# Patient Record
Sex: FEMALE | Race: BLACK OR AFRICAN AMERICAN | NOT HISPANIC OR LATINO | Employment: OTHER | URBAN - METROPOLITAN AREA
[De-identification: names, ages, dates, MRNs, and addresses within clinical notes are randomized per-mention and may not be internally consistent; named-entity substitution may affect disease eponyms.]

---

## 2024-02-28 ENCOUNTER — HOSPITAL ENCOUNTER (EMERGENCY)
Facility: CLINIC | Age: 69
Discharge: HOME | End: 2024-02-28
Attending: STUDENT IN AN ORGANIZED HEALTH CARE EDUCATION/TRAINING PROGRAM | Admitting: STUDENT IN AN ORGANIZED HEALTH CARE EDUCATION/TRAINING PROGRAM

## 2024-02-28 VITALS
TEMPERATURE: 97.8 F | DIASTOLIC BLOOD PRESSURE: 82 MMHG | WEIGHT: 105.6 LBS | OXYGEN SATURATION: 100 % | HEART RATE: 58 BPM | SYSTOLIC BLOOD PRESSURE: 159 MMHG | RESPIRATION RATE: 18 BRPM

## 2024-02-28 DIAGNOSIS — S03.00XA JAW DISLOCATION, INITIAL ENCOUNTER: ICD-10-CM

## 2024-02-28 PROCEDURE — 99285 EMERGENCY DEPT VISIT HI MDM: CPT | Mod: 25

## 2024-02-28 PROCEDURE — 21480 CLTX TMPRMAND DISLC 1ST/SBSQ: CPT | Mod: LT

## 2024-02-28 ASSESSMENT — ACTIVITIES OF DAILY LIVING (ADL): ADLS_ACUITY_SCORE: 33

## 2024-02-28 ASSESSMENT — COLUMBIA-SUICIDE SEVERITY RATING SCALE - C-SSRS
1. IN THE PAST MONTH, HAVE YOU WISHED YOU WERE DEAD OR WISHED YOU COULD GO TO SLEEP AND NOT WAKE UP?: NO
6. HAVE YOU EVER DONE ANYTHING, STARTED TO DO ANYTHING, OR PREPARED TO DO ANYTHING TO END YOUR LIFE?: NO
2. HAVE YOU ACTUALLY HAD ANY THOUGHTS OF KILLING YOURSELF IN THE PAST MONTH?: NO

## 2024-02-29 NOTE — ED NOTES
Pt comes in with reports of yawning and heard pop and felt pain to bilateral jaw sides that went up her face. Pt reports now pain is mainly to left of jaw. Pt mouth deviated toward right side.  Pt able to maintain airway on own and secretions. Pt speaking in full sentences.

## 2024-02-29 NOTE — ED TRIAGE NOTES
Daughter states that the patient was outside today for a while. Then yawned and now feels like something isn't right with her jaw. States that her tongue feels funny, and has a bad headache on the right side. ABCs intact. VSS.      Triage Assessment (Adult)       Row Name 02/28/24 2056          Triage Assessment    Airway WDL WDL        Respiratory WDL    Respiratory WDL WDL        Skin Circulation/Temperature WDL    Skin Circulation/Temperature WDL WDL        Cardiac WDL    Cardiac WDL WDL        Peripheral/Neurovascular WDL    Peripheral Neurovascular WDL WDL        Cognitive/Neuro/Behavioral WDL    Cognitive/Neuro/Behavioral WDL WDL

## 2025-01-24 NOTE — ED PROVIDER NOTES
History     Chief Complaint:  Jaw Pain       A  was used.      Gillian Zuluaga is a 68 year old female who presents with daughter for evaluation of jaw pain.  Around 4 PM she had a big yawn and after that her jaw has been stuck shifted to the side.  Its made it hard for her to eat and talk.  She feels pain on the left side of her head as well as waves of tingling all over her body.  Denies weakness, chest pain or shortness of breath, traumas.  No history of jaw dislocation.      Independent Historian:   None - Patient Only    Review of External Notes:   None       Medications:    None      Past Medical History:    None    Past Surgical History:    None    Physical Exam   Patient Vitals for the past 24 hrs:   BP Temp Temp src Pulse Resp SpO2 Weight   02/28/24 2054 (!) 159/82 97.8  F (36.6  C) Oral 58 18 100 % 47.9 kg (105 lb 9.6 oz)        Physical Exam  General: Awake, alert, in no acute distress   HEENT: Atraumatic   EOM normal   External ears normal   Trachea midline  Mandible deviated to the right, limited jaw opening  Tenderness over left TMJ  Neck: Supple, normal ROM  CV: Regular rate, regular rhythm   No murmur   No lower extremity edema  2+ radial and DP pulses  PULM: Breath sounds normal bilaterally  No wheezes or rales  ABD: Soft, non-tender, non-distended  Normal bowel sounds   No rebound or guarding   MSK: No gross deformities  NEURO: Alert, no focal deficits. 5/5 strength in bilateral upper and lower extremities.  No gross sensory deficits.  Patient moves in a coordinated fashion.  Cranial nerves 2-12 intact.    Skin: Warm, dry and intact      Emergency Department Course   [facility]    Jaw Dislocation    Date/Time: 2/28/2024 9:23 PM    Performed by: Kaitlyn Milner DO  Authorized by: Kaitlyn Milner DO    Risks, benefits and alternatives discussed.      PRE PROCEDURE DETAILS      Injury location:  L TMJ    Chronicity:  New    Range of  motion: reduced      PROCEDURE DETAILS      Manipulation performed: yes      Reduction method: lateral traction on unaffected angle of mandible, posterior pressure on affected coronoid process.    Reduction successful: yes      X-ray confirmed reduction: no      POST PROCEDURE DETAILS      Range of motion: normal        PROCEDURE  Describe Procedure: Immediately after reduction, was checking motion and jaw dislocated a second time.  Easily reduced with the same technique.  Patient Tolerance:  Patient tolerated the procedure well with no immediate complications             Emergency Department Course & Assessments:    Interventions:  Medications - No data to display     Assessments:  None    Independent Interpretation (X-rays, CTs, rhythm strip):  None    Consultations/Discussion of Management or Tests:  None        Social Determinants of Health affecting care:   None    Disposition:  The patient was discharged.     Impression & Plan    CMS Diagnoses: None      Medical Decision Making:  Vitals WNL on arrival.  This patient presents with suspected jaw dislocation based on exam and history for the past 5 hours. Nonspecific tingling without exam abnormalities, doubt concomitant pathology. Verbally consented for gentle reduction technique.  Discussed that it may not be able to reduce given duration of dislocation and may require sedation.  Fortunately job was easily reduced.  It did slip back out when I was checking range of motion though was easily reduced a second time.  Suspect a degree of instability given how easily it popped out again.  Will place ENT  referral and placed in supportive wrap.  Long discussion with patient and daughter at bedside regarding supporting her jaw, avoiding wide opening especially over the next few weeks as she is at risk for recurrent dislocation.  All questions answered.      Diagnosis:    ICD-10-CM    1. Jaw dislocation, initial encounter  S03.00XA Adult ENT  Referral            Kaitlyn Luis,   2/28/2024   Kaitlyn Milner, Kaitlyn Yi, DO  02/28/24 2129    Addendum to correct sidedness of documentation.      Kaitlyn Milner,   02/29/24 0031       Kaitlyn Milner,   02/29/24 0032     128